# Patient Record
Sex: MALE | Race: OTHER | NOT HISPANIC OR LATINO | URBAN - METROPOLITAN AREA
[De-identification: names, ages, dates, MRNs, and addresses within clinical notes are randomized per-mention and may not be internally consistent; named-entity substitution may affect disease eponyms.]

---

## 2017-09-08 ENCOUNTER — EMERGENCY (EMERGENCY)
Facility: HOSPITAL | Age: 42
LOS: 1 days | Discharge: HOME | End: 2017-09-08
Admitting: EMERGENCY MEDICINE
Payer: MEDICAID

## 2017-09-08 VITALS
DIASTOLIC BLOOD PRESSURE: 91 MMHG | OXYGEN SATURATION: 98 % | SYSTOLIC BLOOD PRESSURE: 140 MMHG | HEART RATE: 96 BPM | TEMPERATURE: 98 F | RESPIRATION RATE: 16 BRPM

## 2017-09-08 PROCEDURE — 93010 ELECTROCARDIOGRAM REPORT: CPT | Mod: 59

## 2017-09-08 PROCEDURE — 99284 EMERGENCY DEPT VISIT MOD MDM: CPT | Mod: 25

## 2017-09-08 NOTE — ED ADULT TRIAGE NOTE - CHIEF COMPLAINT QUOTE
Pt st" For a Week I have headache that is mostly on left side but moves around to different spots in head....it started while in [place] tonight it got much worse...." Denies vision changes denies, nausea. Pt st"It is like a crampiness in head right now....I also have a left leg weakness stiffness since this am." MD Ingram to triage for eval. Denies MED HX. speech is clear, no facial droop, , upper and lower ext+ strength.  Pt limping pt st" My left leg is cramping." Pt st" For a Week I have headache that is mostly on left side but moves around to different spots in head....it started while in [place] tonight it got much worse...." Denies vision changes denies, nausea. Pt st"It is like a crampiness in head right now....I also have a left leg weakness stiffness since this am." MD Ingram to triage for eval. Denies MED HX. speech is clear, no facial droop, , upper and lower ext+ strength.  Pt limping pt st" My left leg is cramping." Pt also endorses intermittent "cramping in chest."

## 2017-09-09 PROCEDURE — 70450 CT HEAD/BRAIN W/O DYE: CPT | Mod: 26
